# Patient Record
Sex: FEMALE | Race: BLACK OR AFRICAN AMERICAN | NOT HISPANIC OR LATINO | URBAN - METROPOLITAN AREA
[De-identification: names, ages, dates, MRNs, and addresses within clinical notes are randomized per-mention and may not be internally consistent; named-entity substitution may affect disease eponyms.]

---

## 2022-03-08 ENCOUNTER — EMERGENCY (EMERGENCY)
Facility: HOSPITAL | Age: 37
LOS: 1 days | Discharge: HOME | End: 2022-03-08
Attending: EMERGENCY MEDICINE
Payer: COMMERCIAL

## 2022-03-08 VITALS
HEIGHT: 63 IN | DIASTOLIC BLOOD PRESSURE: 83 MMHG | OXYGEN SATURATION: 99 % | WEIGHT: 160.06 LBS | HEART RATE: 86 BPM | TEMPERATURE: 99 F | SYSTOLIC BLOOD PRESSURE: 115 MMHG | RESPIRATION RATE: 17 BRPM

## 2022-03-08 VITALS
RESPIRATION RATE: 16 BRPM | SYSTOLIC BLOOD PRESSURE: 111 MMHG | OXYGEN SATURATION: 99 % | DIASTOLIC BLOOD PRESSURE: 73 MMHG | HEART RATE: 89 BPM | TEMPERATURE: 98 F

## 2022-03-08 PROCEDURE — 12011 RPR F/E/E/N/L/M 2.5 CM/<: CPT

## 2022-03-08 PROCEDURE — 99283 EMERGENCY DEPT VISIT LOW MDM: CPT | Mod: 25

## 2022-03-08 PROCEDURE — 99284 EMERGENCY DEPT VISIT MOD MDM: CPT | Mod: 25

## 2022-03-08 RX ADMIN — Medication 1 TABLET(S): at 21:50

## 2022-03-08 NOTE — ED PROVIDER NOTE - CLINICAL SUMMARY MEDICAL DECISION MAKING FREE TEXT BOX
Attending note (Guillermo): 37F p/w dog bite to nose, has small superficial laceration to bridge of nose, will require cleaning, suturing to repair; is UTD on tetanus but will offer antibiotic prophylaxis and then dc with suture/wound care instructions, and return precautions.

## 2022-03-08 NOTE — ED PROVIDER NOTE - PHYSICAL EXAMINATION
On Physical Exam:  General: well appearing, in NAD, speaking clearly in full sentences and without difficulty; cooperative with exam  HEENT: anicteric sclera, airway patent, PERRL, EOMI.  -Nose: ~1cm jagged vertical laceration to bridge of nose (superficial, no visible bone/cartilage, minimal bleeding, with adjacent small abrasion. No nasal septal hematoma.  Neck: no neck tenderness, no nuchal rigidity  Extremities: no peripheral edema, no gross deformities

## 2022-03-08 NOTE — ED ADULT NURSE NOTE - OBJECTIVE STATEMENT
36y/o F presenting to Ed ambulatory, A&OX3, complaining of lacerations.Pt up to date with tetanus vaccination (<10 y/ago), presenting after boyfriend's dog scratched/bit nose after giving treat. pt states injury was accidental, known dog, and dog is vaccinated fully, and is behaving otherwise normally. Bleeding controlled. midline nose laceration present. pt denies pain. denies vision changes, no eye injury/pain, no other injuries reported. Safety and comfort measures provided, bed locked and in lowest position, side rails up for safety. Call bell within reach. Awaiting md ashton

## 2022-03-08 NOTE — ED PROVIDER NOTE - PATIENT PORTAL LINK FT
You can access the FollowMyHealth Patient Portal offered by [facility] by registering at the following website: [email or web address]. By joining [facility]’s FollowMyHealth portal, you will also be able to view your health information using other applications (apps) compatible with our system.

## 2022-03-08 NOTE — ED ADULT NURSE NOTE - CCCP TRG CHIEF CMPLNT
Pt has an appt with Dr. Jacobson on 01/21/21.    Pt has an appt to establish care with a new nephrologist, Dr. Chang, on 02/03/21.    Can you please call pt and ask him if he still wants to keep his appt with Dr. Jacobson on 01/21/21?  It doesn't matter to us if patient prefers to transfer care to Dr. Chang, I just don't want him to have to pay for two office visits if he doesn't need to see both of us.  Thank you.   lacerations

## 2022-03-08 NOTE — ED PROVIDER NOTE - NSFOLLOWUPINSTRUCTIONS_ED_ALL_ED_FT
You were evaluated in the Emergency Department for dog bite/ facial laceration.  You were evaluated and examined by a physician, and you had your wound cleaned and sutured (stitches placed to close wound).      There are no signs of emergency conditions requiring admission to the hospital on today's workup.  Based on the evaluation, a presumptive diagnosis was made, however, further evaluation may be required by your primary care physician or a specialist for a more definitive diagnosis.  Therefore, please follow-up as directed or return to the Emergency Department if your symptoms change or worsen.    We recommend that you:  1. See your primary care physician within the next 72 hours for follow up.  Bring a copy of your discharge paperwork (including any test results) to your doctor.  2. Take Augmentin, as prescribed.  3. Keep wound clean/dry, no scrubbing. Avoid direct sunlight to the wound for the next several months.  4. Take ibuprofen 400mg every 6 hours as needed for pain.   5. Return in 5-7 days for suture removal.      *** Return immediately if you have worsening pain, if the wound re-opens, if you have signs of a wound infection (increasing redness/swelling, pus draining from wound, fever), or if you have any other new/concerning symptoms. ***       Laceration    A laceration is a cut that goes through all of the layers of the skin and into the tissue that is right under the skin. Some lacerations heal on their own. Others need to be closed with skin adhesive strips, skin glue, stitches (sutures), or staples. Proper laceration care minimizes the risk of infection and helps the laceration to heal better.  If non-absorbable stitches or staples have been placed, they must be taken out within the time frame instructed by your healthcare provider.    SEEK IMMEDIATE MEDICAL CARE IF YOU HAVE ANY OF THE FOLLOWING SYMPTOMS: swelling around the wound, worsening pain, drainage from the wound, red streaking going away from your wound, inability to move finger or toe near the laceration, or discoloration of skin near the laceration.

## 2022-03-08 NOTE — ED PROCEDURE NOTE - ATTENDING CONTRIBUTION TO CARE
I have participated in and supervised all key portions of the above procedures and agree with the above documentation. HENRI Li MD

## 2022-03-08 NOTE — ED PROVIDER NOTE - NS ED ROS FT
Review of Systems:  -General: no fever   -ENT: no congestion  -Pulmonary: no cough, no shortness of breath  -Cardiac: no chest pain  -Gastrointestinal: no abdominal pain, no nausea, no vomiting, and no diarrhea.  -Genitourinary: no blood or pain with urination  -Musculoskeletal: no back or neck pain  -Skin: no rashes  -Endocrine: No h/o diabetes  -Neurologic: No new weakness or numbness in extremities    All else negative unless otherwise specified elsewhere in this note. Review of Systems:  -General: no fever   -ENT: no congestion  -Musculoskeletal: no back or neck pain  -Skin: no rashes; + nasal laceration (see hpi)  -Endocrine: No h/o diabetes    All else negative unless otherwise specified elsewhere in this note.

## 2022-03-08 NOTE — ED PROVIDER NOTE - OBJECTIVE STATEMENT
Attending note (Guillermo): 38y/o F with no reported medical comorbidities, up to date with tetanus vaccination (<10 y/ago), presenting after boyfriend's dog scratched/bit nose; accidental, known dog, dog is vaccinated fully, and is behaving otherwise normally.  No vision changes, no eye injury/pain, no other injuries reported.

## 2023-02-23 NOTE — ED ADULT TRIAGE NOTE - SOURCE OF INFORMATION
Pt seenthis amno complaints.sittingup ate herbreakfast well voiding well  Vss afebrile  Aao3  Abd ntn d  Extnt    Sp cspod1 doing well  
Patient

## 2023-07-08 ENCOUNTER — EMERGENCY (EMERGENCY)
Facility: HOSPITAL | Age: 38
LOS: 1 days | Discharge: HOME | End: 2023-07-08
Admitting: EMERGENCY MEDICINE
Payer: COMMERCIAL

## 2023-07-08 VITALS
SYSTOLIC BLOOD PRESSURE: 114 MMHG | DIASTOLIC BLOOD PRESSURE: 85 MMHG | TEMPERATURE: 98 F | RESPIRATION RATE: 16 BRPM | HEART RATE: 98 BPM | OXYGEN SATURATION: 100 %

## 2023-07-08 VITALS
TEMPERATURE: 98 F | OXYGEN SATURATION: 100 % | RESPIRATION RATE: 16 BRPM | DIASTOLIC BLOOD PRESSURE: 74 MMHG | HEART RATE: 70 BPM | SYSTOLIC BLOOD PRESSURE: 105 MMHG

## 2023-07-08 DIAGNOSIS — Z98.82 BREAST IMPLANT STATUS: Chronic | ICD-10-CM

## 2023-07-08 DIAGNOSIS — Z90.49 ACQUIRED ABSENCE OF OTHER SPECIFIED PARTS OF DIGESTIVE TRACT: Chronic | ICD-10-CM

## 2023-07-08 DIAGNOSIS — Z98.890 OTHER SPECIFIED POSTPROCEDURAL STATES: Chronic | ICD-10-CM

## 2023-07-08 LAB
ALBUMIN SERPL ELPH-MCNC: 4.1 G/DL — SIGNIFICANT CHANGE UP (ref 3.3–5)
ALP SERPL-CCNC: 93 U/L — SIGNIFICANT CHANGE UP (ref 40–120)
ALT FLD-CCNC: 37 U/L — HIGH (ref 4–33)
ANION GAP SERPL CALC-SCNC: 12 MMOL/L — SIGNIFICANT CHANGE UP (ref 7–14)
AST SERPL-CCNC: 20 U/L — SIGNIFICANT CHANGE UP (ref 4–32)
BASOPHILS # BLD AUTO: 0.03 K/UL — SIGNIFICANT CHANGE UP (ref 0–0.2)
BASOPHILS NFR BLD AUTO: 0.5 % — SIGNIFICANT CHANGE UP (ref 0–2)
BILIRUB SERPL-MCNC: 0.3 MG/DL — SIGNIFICANT CHANGE UP (ref 0.2–1.2)
BUN SERPL-MCNC: 10 MG/DL — SIGNIFICANT CHANGE UP (ref 7–23)
CALCIUM SERPL-MCNC: 8.7 MG/DL — SIGNIFICANT CHANGE UP (ref 8.4–10.5)
CHLORIDE SERPL-SCNC: 104 MMOL/L — SIGNIFICANT CHANGE UP (ref 98–107)
CO2 SERPL-SCNC: 24 MMOL/L — SIGNIFICANT CHANGE UP (ref 22–31)
CREAT SERPL-MCNC: 0.59 MG/DL — SIGNIFICANT CHANGE UP (ref 0.5–1.3)
EGFR: 117 ML/MIN/1.73M2 — SIGNIFICANT CHANGE UP
EOSINOPHIL # BLD AUTO: 0.06 K/UL — SIGNIFICANT CHANGE UP (ref 0–0.5)
EOSINOPHIL NFR BLD AUTO: 1 % — SIGNIFICANT CHANGE UP (ref 0–6)
GLUCOSE SERPL-MCNC: 89 MG/DL — SIGNIFICANT CHANGE UP (ref 70–99)
HCT VFR BLD CALC: 36.7 % — SIGNIFICANT CHANGE UP (ref 34.5–45)
HGB BLD-MCNC: 12.5 G/DL — SIGNIFICANT CHANGE UP (ref 11.5–15.5)
IANC: 3.27 K/UL — SIGNIFICANT CHANGE UP (ref 1.8–7.4)
IMM GRANULOCYTES NFR BLD AUTO: 0.5 % — SIGNIFICANT CHANGE UP (ref 0–0.9)
LYMPHOCYTES # BLD AUTO: 2.38 K/UL — SIGNIFICANT CHANGE UP (ref 1–3.3)
LYMPHOCYTES # BLD AUTO: 38.6 % — SIGNIFICANT CHANGE UP (ref 13–44)
MCHC RBC-ENTMCNC: 31.3 PG — SIGNIFICANT CHANGE UP (ref 27–34)
MCHC RBC-ENTMCNC: 34.1 GM/DL — SIGNIFICANT CHANGE UP (ref 32–36)
MCV RBC AUTO: 92 FL — SIGNIFICANT CHANGE UP (ref 80–100)
MONOCYTES # BLD AUTO: 0.4 K/UL — SIGNIFICANT CHANGE UP (ref 0–0.9)
MONOCYTES NFR BLD AUTO: 6.5 % — SIGNIFICANT CHANGE UP (ref 2–14)
NEUTROPHILS # BLD AUTO: 3.27 K/UL — SIGNIFICANT CHANGE UP (ref 1.8–7.4)
NEUTROPHILS NFR BLD AUTO: 52.9 % — SIGNIFICANT CHANGE UP (ref 43–77)
NRBC # BLD: 0 /100 WBCS — SIGNIFICANT CHANGE UP (ref 0–0)
NRBC # FLD: 0 K/UL — SIGNIFICANT CHANGE UP (ref 0–0)
NT-PROBNP SERPL-SCNC: <36 PG/ML — SIGNIFICANT CHANGE UP
PLATELET # BLD AUTO: 228 K/UL — SIGNIFICANT CHANGE UP (ref 150–400)
POTASSIUM SERPL-MCNC: 3.4 MMOL/L — LOW (ref 3.5–5.3)
POTASSIUM SERPL-SCNC: 3.4 MMOL/L — LOW (ref 3.5–5.3)
PROT SERPL-MCNC: 6.6 G/DL — SIGNIFICANT CHANGE UP (ref 6–8.3)
RBC # BLD: 3.99 M/UL — SIGNIFICANT CHANGE UP (ref 3.8–5.2)
RBC # FLD: 13.2 % — SIGNIFICANT CHANGE UP (ref 10.3–14.5)
SODIUM SERPL-SCNC: 140 MMOL/L — SIGNIFICANT CHANGE UP (ref 135–145)
TROPONIN T, HIGH SENSITIVITY RESULT: <6 NG/L — SIGNIFICANT CHANGE UP
WBC # BLD: 6.17 K/UL — SIGNIFICANT CHANGE UP (ref 3.8–10.5)
WBC # FLD AUTO: 6.17 K/UL — SIGNIFICANT CHANGE UP (ref 3.8–10.5)

## 2023-07-08 PROCEDURE — 99285 EMERGENCY DEPT VISIT HI MDM: CPT

## 2023-07-08 PROCEDURE — 71046 X-RAY EXAM CHEST 2 VIEWS: CPT | Mod: 26

## 2023-07-08 PROCEDURE — 72125 CT NECK SPINE W/O DYE: CPT | Mod: 26,MA

## 2023-07-08 PROCEDURE — 72131 CT LUMBAR SPINE W/O DYE: CPT | Mod: 26,MA

## 2023-07-08 PROCEDURE — 70450 CT HEAD/BRAIN W/O DYE: CPT | Mod: 26,MA

## 2023-07-08 PROCEDURE — 93010 ELECTROCARDIOGRAM REPORT: CPT

## 2023-07-08 RX ORDER — ACETAMINOPHEN 500 MG
1000 TABLET ORAL ONCE
Refills: 0 | Status: COMPLETED | OUTPATIENT
Start: 2023-07-08 | End: 2023-07-08

## 2023-07-08 RX ORDER — LIDOCAINE 4 G/100G
1 CREAM TOPICAL ONCE
Refills: 0 | Status: COMPLETED | OUTPATIENT
Start: 2023-07-08 | End: 2023-07-08

## 2023-07-08 RX ORDER — METOCLOPRAMIDE HCL 10 MG
10 TABLET ORAL ONCE
Refills: 0 | Status: COMPLETED | OUTPATIENT
Start: 2023-07-08 | End: 2023-07-08

## 2023-07-08 RX ORDER — DIAZEPAM 5 MG
5 TABLET ORAL ONCE
Refills: 0 | Status: DISCONTINUED | OUTPATIENT
Start: 2023-07-08 | End: 2023-07-08

## 2023-07-08 RX ADMIN — Medication 5 MILLIGRAM(S): at 19:59

## 2023-07-08 RX ADMIN — Medication 10 MILLIGRAM(S): at 19:59

## 2023-07-08 RX ADMIN — LIDOCAINE 1 PATCH: 4 CREAM TOPICAL at 19:59

## 2023-07-08 RX ADMIN — Medication 400 MILLIGRAM(S): at 18:00

## 2023-07-08 NOTE — ED PROVIDER NOTE - NSFOLLOWUPINSTRUCTIONS_ED_ALL_ED_FT
Follow up with your primary care medical doctor within 2-3 days of ER discharge.  **Bring your discharge papers / test results with you to your follow up appointment.    If you ever need assistance finding a doctor to follow up with, you may call the [facility] Patient Access Services helpline at [phone] to find names/contact #s for a provider/specialist to follow up with.    Rest / no strenuous activity.  Stay well hydrated.    A copy of your test results is being provided to you.    All results including incidental findings were reviewed at discharge.    Should you have any questions that arise after you are discharged, please feel free to contact the ER ([phone]) to have your questions answered.    MEDICATIONS:    --ibuprofen (Motrin or Advil):  over-the-counter 200 milligram tablets:       Take three tablets (600 milligram dose) every 6 hours as needed for pain/inflammation; take WITH FOOD.   --Flexeril (cyclobenzaprine) may be increased to 10 milligram dose every 8 hours as needed for muscle spasm/stiffness.      ***Return to the Emergency Department IMMEDIATELY if you experience any new / worsening symptoms or have any problems / concerns.***
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
## 2023-07-08 NOTE — ED ADULT NURSE NOTE - NSFALLHARMRISKINTERV_ED_ALL_ED
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
## 2023-07-08 NOTE — ED PROVIDER NOTE - PROGRESS NOTE DETAILS
Pt states pain is still present and unchanged. Discussed CTH, CT c-spine, and CT l-spine are all without acute findings. Also discussed CXR without acute findings. Will order for valium 5 and reglan. Labs pending. Will continue to reassess NORMA Leslie Addendum----- NORMA Leslie Addendum-----This patient was signed out to me by NORMA Jung; all test results reviewed  / discussed.  NORMA Jung had gone to reassess pt; pt was somnolent after receiving diazepam and pt had stated that she drove herself to ED and was going to be driving home.  Pt was signed out to me to wait for effects of diazepam to wear off before pt being dc'd home.  In the interim, pt objectively noted to be resting comfortably; pt has been clinically stable; no issues thus far.  Pt in interim was able to contact her friend to come pick her up and this person came to the ED to  patient as witnessed by staff.  Pt was advised of effects of muscle relaxants and cautioned that she should never drive while taking these medications; pt verbalized understanding and agreement.  All test results were discussed in full detail per my usual process, including incidental findings were discussed with pt; pt was advised to increase her intake of potassium-rich foods and informed of preliminary status of CXR report; pt advised to call ED 7/9 daytime to f/u on official radiology report.  Pt is being discharged per below instructions; pt medically stable at this time.  Of note, pt was also advised of time diazepam was given in ED and was cautioned to wait a minimum of 6 hours before resuming flexeril medication.  Pt verbalized understanding to all as discussed; pt was given opportunity to ask questions.  Work note was given for 7/8/23 - 7/11/23 time frame as requested by pt.
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
## 2023-07-08 NOTE — ED PROVIDER NOTE - ENMT, MLM
Airway patent, Nasal mucosa clear. Mouth with normal mucosa. Throat has no vesicles, no oropharyngeal exudates and uvula is midline. no hemotympanum. No blood in posterior phaynx
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
## 2023-07-08 NOTE — ED PROVIDER NOTE - PATIENT PORTAL LINK FT
You can access the FollowMyHealth Patient Portal offered by [facility] by registering at the following website: [email or web address]. By joining [facility]’s FollowMyHealth portal, you will also be able to view your health information using other applications (apps) compatible with our system.

## 2023-07-09 RX ORDER — CYCLOBENZAPRINE HYDROCHLORIDE 10 MG/1
1 TABLET, FILM COATED ORAL
Qty: 20 | Refills: 0
Start: 2023-07-09